# Patient Record
Sex: MALE | Race: BLACK OR AFRICAN AMERICAN | Employment: FULL TIME | URBAN - METROPOLITAN AREA
[De-identification: names, ages, dates, MRNs, and addresses within clinical notes are randomized per-mention and may not be internally consistent; named-entity substitution may affect disease eponyms.]

---

## 2017-08-25 ENCOUNTER — ED HISTORICAL/CONVERTED ENCOUNTER (OUTPATIENT)
Dept: OTHER | Age: 32
End: 2017-08-25

## 2021-06-19 ENCOUNTER — HOSPITAL ENCOUNTER (EMERGENCY)
Age: 36
Discharge: HOME | End: 2021-06-19
Attending: EMERGENCY MEDICINE

## 2021-06-19 VITALS
OXYGEN SATURATION: 98 % | WEIGHT: 200 LBS | RESPIRATION RATE: 20 BRPM | HEIGHT: 66 IN | DIASTOLIC BLOOD PRESSURE: 90 MMHG | BODY MASS INDEX: 32.14 KG/M2 | SYSTOLIC BLOOD PRESSURE: 148 MMHG | HEART RATE: 71 BPM | TEMPERATURE: 98.6 F

## 2021-06-19 DIAGNOSIS — M62.838 MUSCLE SPASMS OF NECK: Primary | ICD-10-CM
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
## 2021-06-19 DIAGNOSIS — T07.XXXA MULTIPLE CONTUSIONS: ICD-10-CM

## 2021-06-19 PROCEDURE — 99283 EMERGENCY DEPT VISIT LOW MDM: CPT

## 2021-06-19 PROCEDURE — 74011250637 HC RX REV CODE- 250/637: Performed by: EMERGENCY MEDICINE

## 2021-06-19 RX ORDER — IBUPROFEN 800 MG/1
800 TABLET ORAL
Qty: 20 TABLET | Refills: 0 | Status: SHIPPED | OUTPATIENT
Start: 2021-06-19 | End: 2021-06-26

## 2021-06-19 RX ORDER — IBUPROFEN 800 MG/1
800 TABLET ORAL
Status: COMPLETED | OUTPATIENT
Start: 2021-06-19 | End: 2021-06-19

## 2021-06-19 RX ORDER — CYCLOBENZAPRINE HCL 10 MG
10 TABLET ORAL
Qty: 15 TABLET | Refills: 0 | Status: SHIPPED | OUTPATIENT
Start: 2021-06-19 | End: 2021-06-24

## 2021-06-19 RX ADMIN — IBUPROFEN 800 MG: 800 TABLET, FILM COATED ORAL at 15:11
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
## 2025-06-29 ENCOUNTER — APPOINTMENT (OUTPATIENT)
Facility: HOSPITAL | Age: 40
DRG: 065 | End: 2025-06-29
Payer: MEDICAID

## 2025-06-29 ENCOUNTER — HOSPITAL ENCOUNTER (INPATIENT)
Facility: HOSPITAL | Age: 40
LOS: 3 days | Discharge: NURSING, REHAB OR LONG-TERM CARE | DRG: 065 | End: 2025-07-02
Attending: EMERGENCY MEDICINE | Admitting: INTERNAL MEDICINE
Payer: MEDICAID

## 2025-06-29 DIAGNOSIS — I63.9 CEREBROVASCULAR ACCIDENT (CVA), UNSPECIFIED MECHANISM (HCC): Primary | ICD-10-CM

## 2025-06-29 PROBLEM — R09.89 SUSPECTED CEREBROVASCULAR ACCIDENT (CVA): Status: ACTIVE | Noted: 2025-06-29

## 2025-06-29 LAB
ALBUMIN SERPL-MCNC: 4.3 G/DL (ref 3.5–5)
ALBUMIN/GLOB SERPL: 1.3 (ref 1.1–2.2)
ALP SERPL-CCNC: 123 U/L (ref 45–117)
ALT SERPL-CCNC: 29 U/L (ref 12–78)
ANION GAP SERPL CALC-SCNC: 5 MMOL/L (ref 2–12)
AST SERPL W P-5'-P-CCNC: 24 U/L (ref 15–37)
BASOPHILS # BLD: 0.09 K/UL (ref 0–0.1)
BASOPHILS NFR BLD: 1 % (ref 0–1)
BILIRUB SERPL-MCNC: 0.5 MG/DL (ref 0.2–1)
BUN SERPL-MCNC: 12 MG/DL (ref 6–20)
BUN/CREAT SERPL: 10 (ref 12–20)
CA-I BLD-MCNC: 9.4 MG/DL (ref 8.5–10.1)
CHLORIDE SERPL-SCNC: 112 MMOL/L (ref 97–108)
CO2 SERPL-SCNC: 26 MMOL/L (ref 21–32)
CREAT SERPL-MCNC: 1.22 MG/DL (ref 0.7–1.3)
DIFFERENTIAL METHOD BLD: ABNORMAL
EOSINOPHIL # BLD: 0.26 K/UL (ref 0–0.4)
EOSINOPHIL NFR BLD: 3 % (ref 0–7)
ERYTHROCYTE [DISTWIDTH] IN BLOOD BY AUTOMATED COUNT: 14.5 % (ref 11.5–14.5)
GLOBULIN SER CALC-MCNC: 3.4 G/DL (ref 2–4)
GLUCOSE SERPL-MCNC: 122 MG/DL (ref 65–100)
HCT VFR BLD AUTO: 48.3 % (ref 36.6–50.3)
HGB BLD-MCNC: 17.6 G/DL (ref 12.1–17)
IMM GRANULOCYTES # BLD AUTO: 0 K/UL
IMM GRANULOCYTES NFR BLD AUTO: 0 %
INR PPP: 1 (ref 0.9–1.1)
LYMPHOCYTES # BLD: 4.29 K/UL (ref 0.8–3.5)
LYMPHOCYTES NFR BLD: 50 % (ref 12–49)
MCH RBC QN AUTO: 29.9 PG (ref 26–34)
MCHC RBC AUTO-ENTMCNC: 36.4 G/DL (ref 30–36.5)
MCV RBC AUTO: 82 FL (ref 80–99)
MONOCYTES # BLD: 0.26 K/UL (ref 0–1)
MONOCYTES NFR BLD: 3 % (ref 5–13)
NEUTS SEG # BLD: 3.7 K/UL (ref 1.8–8)
NEUTS SEG NFR BLD: 43 % (ref 32–75)
NRBC # BLD: 0 K/UL (ref 0–0.01)
NRBC BLD-RTO: 0 PER 100 WBC
PLATELET # BLD AUTO: 201 K/UL (ref 150–400)
PMV BLD AUTO: 13 FL (ref 8.9–12.9)
POTASSIUM SERPL-SCNC: 4.1 MMOL/L (ref 3.5–5.1)
PROT SERPL-MCNC: 7.7 G/DL (ref 6.4–8.2)
PROTHROMBIN TIME: 13.1 SEC (ref 11.9–14.1)
RBC # BLD AUTO: 5.89 M/UL (ref 4.1–5.7)
RBC MORPH BLD: ABNORMAL
SODIUM SERPL-SCNC: 143 MMOL/L (ref 136–145)
TROPONIN I SERPL HS-MCNC: 21 NG/L (ref 0–76)
WBC # BLD AUTO: 8.6 K/UL (ref 4.1–11.1)

## 2025-06-29 PROCEDURE — 1100000000 HC RM PRIVATE

## 2025-06-29 PROCEDURE — 0042T CT BRAIN PERFUSION: CPT

## 2025-06-29 PROCEDURE — 85025 COMPLETE CBC W/AUTO DIFF WBC: CPT

## 2025-06-29 PROCEDURE — 70496 CT ANGIOGRAPHY HEAD: CPT

## 2025-06-29 PROCEDURE — 80053 COMPREHEN METABOLIC PANEL: CPT

## 2025-06-29 PROCEDURE — 82962 GLUCOSE BLOOD TEST: CPT

## 2025-06-29 PROCEDURE — 93005 ELECTROCARDIOGRAM TRACING: CPT | Performed by: EMERGENCY MEDICINE

## 2025-06-29 PROCEDURE — 99285 EMERGENCY DEPT VISIT HI MDM: CPT

## 2025-06-29 PROCEDURE — 36415 COLL VENOUS BLD VENIPUNCTURE: CPT

## 2025-06-29 PROCEDURE — 72125 CT NECK SPINE W/O DYE: CPT

## 2025-06-29 PROCEDURE — 4A03X5D MEASUREMENT OF ARTERIAL FLOW, INTRACRANIAL, EXTERNAL APPROACH: ICD-10-PCS | Performed by: EMERGENCY MEDICINE

## 2025-06-29 PROCEDURE — 70450 CT HEAD/BRAIN W/O DYE: CPT

## 2025-06-29 PROCEDURE — 85610 PROTHROMBIN TIME: CPT

## 2025-06-29 PROCEDURE — 71045 X-RAY EXAM CHEST 1 VIEW: CPT

## 2025-06-29 PROCEDURE — 6360000004 HC RX CONTRAST MEDICATION: Performed by: EMERGENCY MEDICINE

## 2025-06-29 PROCEDURE — 84484 ASSAY OF TROPONIN QUANT: CPT

## 2025-06-29 PROCEDURE — 6370000000 HC RX 637 (ALT 250 FOR IP): Performed by: EMERGENCY MEDICINE

## 2025-06-29 RX ORDER — ASPIRIN 300 MG/1
300 SUPPOSITORY RECTAL DAILY
Status: DISCONTINUED | OUTPATIENT
Start: 2025-06-30 | End: 2025-07-02 | Stop reason: HOSPADM

## 2025-06-29 RX ORDER — SODIUM CHLORIDE 9 MG/ML
INJECTION, SOLUTION INTRAVENOUS PRN
Status: DISCONTINUED | OUTPATIENT
Start: 2025-06-29 | End: 2025-07-02 | Stop reason: HOSPADM

## 2025-06-29 RX ORDER — POLYETHYLENE GLYCOL 3350 17 G/17G
17 POWDER, FOR SOLUTION ORAL DAILY PRN
Status: DISCONTINUED | OUTPATIENT
Start: 2025-06-29 | End: 2025-07-02 | Stop reason: HOSPADM

## 2025-06-29 RX ORDER — ATORVASTATIN CALCIUM 40 MG/1
80 TABLET, FILM COATED ORAL NIGHTLY
Status: DISCONTINUED | OUTPATIENT
Start: 2025-06-29 | End: 2025-07-02 | Stop reason: HOSPADM

## 2025-06-29 RX ORDER — ASPIRIN 325 MG
325 TABLET ORAL DAILY
Status: DISCONTINUED | OUTPATIENT
Start: 2025-06-30 | End: 2025-07-02 | Stop reason: HOSPADM

## 2025-06-29 RX ORDER — ASPIRIN 81 MG/1
324 TABLET, CHEWABLE ORAL ONCE
Status: COMPLETED | OUTPATIENT
Start: 2025-06-29 | End: 2025-06-29

## 2025-06-29 RX ORDER — SODIUM CHLORIDE 0.9 % (FLUSH) 0.9 %
5-40 SYRINGE (ML) INJECTION PRN
Status: DISCONTINUED | OUTPATIENT
Start: 2025-06-29 | End: 2025-07-02 | Stop reason: HOSPADM

## 2025-06-29 RX ORDER — IOPAMIDOL 755 MG/ML
100 INJECTION, SOLUTION INTRAVASCULAR
Status: COMPLETED | OUTPATIENT
Start: 2025-06-29 | End: 2025-06-29

## 2025-06-29 RX ORDER — LABETALOL HYDROCHLORIDE 5 MG/ML
10 INJECTION, SOLUTION INTRAVENOUS EVERY 10 MIN PRN
Status: DISCONTINUED | OUTPATIENT
Start: 2025-06-29 | End: 2025-07-02 | Stop reason: HOSPADM

## 2025-06-29 RX ORDER — CLOPIDOGREL 300 MG/1
300 TABLET, FILM COATED ORAL ONCE
Status: COMPLETED | OUTPATIENT
Start: 2025-06-29 | End: 2025-06-29

## 2025-06-29 RX ORDER — ONDANSETRON 4 MG/1
4 TABLET, ORALLY DISINTEGRATING ORAL EVERY 8 HOURS PRN
Status: DISCONTINUED | OUTPATIENT
Start: 2025-06-29 | End: 2025-07-02 | Stop reason: HOSPADM

## 2025-06-29 RX ORDER — ENOXAPARIN SODIUM 100 MG/ML
40 INJECTION SUBCUTANEOUS DAILY
Status: DISCONTINUED | OUTPATIENT
Start: 2025-06-30 | End: 2025-07-02 | Stop reason: HOSPADM

## 2025-06-29 RX ORDER — ONDANSETRON 2 MG/ML
4 INJECTION INTRAMUSCULAR; INTRAVENOUS EVERY 6 HOURS PRN
Status: DISCONTINUED | OUTPATIENT
Start: 2025-06-29 | End: 2025-07-02 | Stop reason: HOSPADM

## 2025-06-29 RX ORDER — SODIUM CHLORIDE 0.9 % (FLUSH) 0.9 %
5-40 SYRINGE (ML) INJECTION EVERY 12 HOURS SCHEDULED
Status: DISCONTINUED | OUTPATIENT
Start: 2025-06-29 | End: 2025-07-02 | Stop reason: HOSPADM

## 2025-06-29 RX ADMIN — CLOPIDOGREL BISULFATE 300 MG: 300 TABLET, FILM COATED ORAL at 22:14

## 2025-06-29 RX ADMIN — IOPAMIDOL 100 ML: 755 INJECTION, SOLUTION INTRAVENOUS at 18:55

## 2025-06-29 RX ADMIN — ASPIRIN 324 MG: 81 TABLET, CHEWABLE ORAL at 22:14

## 2025-06-29 NOTE — ED PROVIDER NOTES
[facility] EMERGENCY DEPT  EMERGENCY DEPARTMENT HISTORY AND PHYSICAL EXAM      Date of evaluation: 6/29/2025  Patient Name: Lorne Rader  Birthdate 1985  MRN: [id number]  ED Provider: Kendall Faria MD   Note Started: 6:48 PM EDT 6/29/25    HISTORY OF PRESENT ILLNESS     Chief Complaint   Patient presents with    Cerebrovascular Accident       History Provided By: Patient, spouse     HPI: Lorne Rader is a 40 y.o. male this gentleman presents with last known well of 2:30 AM this morning.  He went to bed at that time woke up at noon and felt off balance getting go right back to sleep woke up just after 2 felt off balance but his wife helped him to the shower noticed right-sided weakness slurred speech and he fell in the shower onto his bottom hit the right side of his head on the faucet no loss of consciousness denies neck pain.  Denies headache or vision changes.  Denies blood thinners alcohol or illicit drugs.  History of hypertension.    PAST MEDICAL HISTORY   Past Medical History:  History reviewed. No pertinent past medical history.    Past Surgical History:  History reviewed. No pertinent surgical history.    Family History:  History reviewed. No pertinent family history.    Social History:  Social History     Tobacco Use    Smoking status: Every Day     Types: Cigarettes    Smokeless tobacco: Never       Allergies:  Allergies   Allergen Reactions    Amoxicillin     Pcn [Penicillins]        PCP: No primary care provider on file.    Current Meds:   No current facility-administered medications for this encounter.     Current Outpatient Medications   Medication Sig Dispense Refill    atorvastatin (LIPITOR) 80 MG tablet Take 1 tablet by mouth nightly 30 tablet 3    aspirin 81 MG EC tablet Take 1 tablet by mouth daily 90 tablet 0       Social Determinants of Health:   Social Drivers of Health     Tobacco Use: High Risk (6/30/2025)    Patient History     Smoking Tobacco Use: Every Day     Smokeless Tobacco

## 2025-06-29 NOTE — ED TRIAGE NOTES
Patient reports numbness that started around 1200 today when he got out of bed. States that he felt weak and off balance. States that he took a nap and when he woke up his entire R side was completely numb tried to get in the shower and then fell in the shower on his bottom hit the right side of his head on the faucet.  Denies thinners, denies LOC.  Slurred speech in triage.    Stroke alert called overhead @ 1826       Patient alert and oriented and in wheel chair on arrival but usually walks without assistance.

## 2025-06-30 ENCOUNTER — APPOINTMENT (OUTPATIENT)
Facility: HOSPITAL | Age: 40
DRG: 065 | End: 2025-06-30
Attending: INTERNAL MEDICINE
Payer: MEDICAID

## 2025-06-30 LAB
ANION GAP SERPL CALC-SCNC: 7 MMOL/L (ref 2–12)
BUN SERPL-MCNC: 11 MG/DL (ref 6–20)
BUN/CREAT SERPL: 10 (ref 12–20)
CA-I BLD-MCNC: 9.5 MG/DL (ref 8.5–10.1)
CHLORIDE SERPL-SCNC: 110 MMOL/L (ref 97–108)
CHOLEST SERPL-MCNC: 220 MG/DL
CO2 SERPL-SCNC: 23 MMOL/L (ref 21–32)
CREAT SERPL-MCNC: 1.11 MG/DL (ref 0.7–1.3)
EKG ATRIAL RATE: 54 BPM
EKG DIAGNOSIS: NORMAL
EKG P AXIS: 31 DEGREES
EKG P-R INTERVAL: 156 MS
EKG Q-T INTERVAL: 458 MS
EKG QRS DURATION: 80 MS
EKG QTC CALCULATION (BAZETT): 434 MS
EKG R AXIS: 6 DEGREES
EKG T AXIS: 25 DEGREES
EKG VENTRICULAR RATE: 54 BPM
ERYTHROCYTE [DISTWIDTH] IN BLOOD BY AUTOMATED COUNT: 14.2 % (ref 11.5–14.5)
GLUCOSE BLD STRIP.AUTO-MCNC: 132 MG/DL (ref 65–100)
GLUCOSE SERPL-MCNC: 98 MG/DL (ref 65–100)
HCT VFR BLD AUTO: 46.3 % (ref 36.6–50.3)
HDLC SERPL-MCNC: 45 MG/DL
HDLC SERPL: 4.9 (ref 0–5)
HGB BLD-MCNC: 16.6 G/DL (ref 12.1–17)
LDLC SERPL CALC-MCNC: 144.8 MG/DL (ref 0–100)
LIPID PANEL: ABNORMAL
MCH RBC QN AUTO: 29.4 PG (ref 26–34)
MCHC RBC AUTO-ENTMCNC: 35.9 G/DL (ref 30–36.5)
MCV RBC AUTO: 81.9 FL (ref 80–99)
NRBC # BLD: 0 K/UL (ref 0–0.01)
NRBC BLD-RTO: 0 PER 100 WBC
PERFORMED BY:: ABNORMAL
PLATELET # BLD AUTO: 177 K/UL (ref 150–400)
POTASSIUM SERPL-SCNC: 3.7 MMOL/L (ref 3.5–5.1)
RBC # BLD AUTO: 5.65 M/UL (ref 4.1–5.7)
SODIUM SERPL-SCNC: 140 MMOL/L (ref 136–145)
TRIGL SERPL-MCNC: 151 MG/DL
VLDLC SERPL CALC-MCNC: 30.2 MG/DL
WBC # BLD AUTO: 8.3 K/UL (ref 4.1–11.1)

## 2025-06-30 PROCEDURE — 97165 OT EVAL LOW COMPLEX 30 MIN: CPT

## 2025-06-30 PROCEDURE — 80048 BASIC METABOLIC PNL TOTAL CA: CPT

## 2025-06-30 PROCEDURE — 6360000002 HC RX W HCPCS: Performed by: INTERNAL MEDICINE

## 2025-06-30 PROCEDURE — 6370000000 HC RX 637 (ALT 250 FOR IP): Performed by: INTERNAL MEDICINE

## 2025-06-30 PROCEDURE — 92610 EVALUATE SWALLOWING FUNCTION: CPT

## 2025-06-30 PROCEDURE — 1100000000 HC RM PRIVATE

## 2025-06-30 PROCEDURE — 94761 N-INVAS EAR/PLS OXIMETRY MLT: CPT

## 2025-06-30 PROCEDURE — 36415 COLL VENOUS BLD VENIPUNCTURE: CPT

## 2025-06-30 PROCEDURE — 83036 HEMOGLOBIN GLYCOSYLATED A1C: CPT

## 2025-06-30 PROCEDURE — 97161 PT EVAL LOW COMPLEX 20 MIN: CPT

## 2025-06-30 PROCEDURE — 85027 COMPLETE CBC AUTOMATED: CPT

## 2025-06-30 PROCEDURE — 2500000003 HC RX 250 WO HCPCS: Performed by: INTERNAL MEDICINE

## 2025-06-30 PROCEDURE — 92523 SPEECH SOUND LANG COMPREHEN: CPT

## 2025-06-30 PROCEDURE — G0426 INPT/ED TELECONSULT50: HCPCS | Performed by: SPECIALIST

## 2025-06-30 PROCEDURE — 80061 LIPID PANEL: CPT

## 2025-06-30 PROCEDURE — 70551 MRI BRAIN STEM W/O DYE: CPT

## 2025-06-30 PROCEDURE — 97530 THERAPEUTIC ACTIVITIES: CPT

## 2025-06-30 PROCEDURE — 6370000000 HC RX 637 (ALT 250 FOR IP)

## 2025-06-30 PROCEDURE — 97535 SELF CARE MNGMENT TRAINING: CPT

## 2025-06-30 RX ORDER — NICOTINE 21 MG/24HR
1 PATCH, TRANSDERMAL 24 HOURS TRANSDERMAL DAILY
Status: DISCONTINUED | OUTPATIENT
Start: 2025-06-30 | End: 2025-07-02 | Stop reason: HOSPADM

## 2025-06-30 RX ADMIN — ASPIRIN 325 MG: 325 TABLET ORAL at 08:51

## 2025-06-30 RX ADMIN — SODIUM CHLORIDE, PRESERVATIVE FREE 10 ML: 5 INJECTION INTRAVENOUS at 08:52

## 2025-06-30 RX ADMIN — SODIUM CHLORIDE, PRESERVATIVE FREE 10 ML: 5 INJECTION INTRAVENOUS at 00:20

## 2025-06-30 RX ADMIN — ENOXAPARIN SODIUM 40 MG: 100 INJECTION SUBCUTANEOUS at 08:52

## 2025-06-30 RX ADMIN — ATORVASTATIN CALCIUM 80 MG: 40 TABLET, FILM COATED ORAL at 00:19

## 2025-06-30 RX ADMIN — ATORVASTATIN CALCIUM 80 MG: 40 TABLET, FILM COATED ORAL at 21:22

## 2025-06-30 NOTE — PROGRESS NOTES
4 Eyes Skin Assessment     NAME:  Lorne Rader  YOB: 1985  MEDICAL RECORD NUMBER:  [id number]    The patient is being assessed for  Admission    I agree that at least one RN has performed a thorough Head to Toe Skin Assessment on the patient. ALL assessment sites listed below have been assessed.      Areas assessed by both nurses:    Head, Face, Ears, Shoulders, Back, Chest, Arms, Elbows, Hands, Sacrum. Buttock, Coccyx, Ischium, Legs. Feet and Heels, and Under Medical Devices         Does the Patient have a Wound? No noted wound(s)       Rayray Prevention initiated by RN: No  Wound Care Orders initiated by RN: No    Pressure Injury (Stage 1,2,3,4, Unstageable, DTI, NWPT, and Complex wounds) if present, place Wound referral order by RN under : No    New Ostomies, if present place, Ostomy referral order under : No     Nurse 1 eSignature: Electronically signed by Vero Andrea RN on 6/30/25 at 5:38 AM EDT    **SHARE this note so that the co-signing nurse can place an eSignature**    Nurse 2 eSignature: Electronically signed by Babita Morales RN on 6/30/25 at 7:03 AM EDT

## 2025-06-30 NOTE — PROGRESS NOTES
Spiritual Health History and Assessment/Progress Note  [facility]    Initial Encounter,  ,  ,      Name: Lorne Rader MRN: [id number]    Age: 40 y.o.     Sex: male   Language: English   Evangelical: None   Suspected cerebrovascular accident (CVA)     Date: 6/30/2025                           Spiritual Assessment began in [facility]        Referral/Consult From: Nurse   Encounter Overview/Reason: Initial Encounter  Service Provided For: Patient and family together    Nandini, Belief, Meaning:   Patient identifies as spiritual  Family/Friends identify as spiritual      Importance and Influence:  Patient has spiritual/personal beliefs that influence decisions regarding their health  Family/Friends have spiritual/personal beliefs that influence decisions regarding the patient's health    Community:  Patient Other: Currently not connected with a Methodist  Family/Friends No family/friends present    Assessment and Plan of Care:     Patient Interventions include: Facilitated expression of thoughts and feelings, Explored spiritual coping/struggle/distress, Affirmed coping skills/support systems, Provided sacramental/Hoahaoism ritual, and Facilitated life review and/ or legacy  Family/Friends Interventions include: Facilitated expression of thoughts and feelings    Patient Plan of Care: Spiritual Care available upon further referral  Family/Friends Plan of Care: No spiritual needs identified for follow-up    Electronically signed by AVANI Gore on 6/30/2025 at 1:04 PM     The  visited with the patient and his wife in Room 572. The patient tearfully communicated the shock of his illness, the lesson he believes he is learning, in terms of prioritizing what is important and requested prayer with his family member present in his room.    They both thanked the  for her concern, listening and prayer. They were informed that chaplains are available as needed.

## 2025-06-30 NOTE — CONSULTS
Name: Lorne Rader  : 1985  MRN: [id number]  CSN: [id number]    Consult Date:  2025  Referring Physician:  Vy Mccall MD           [facility] TELE-NEUROLOGY CONSULTATION      ASSESSMENT    Stroke: The patient's symptoms and location of the stroke suggest this is thrombotic rather than embolic      RECOMMENDATIONS    Continue admission according to hospital stroke order sets with cardiac telemetry monitoring  Echocardiogram with bubble study if available during the admission or complete as an outpatient.  Evaluate stroke risk factors (incl HgbA1c).   Provide stroke education including tobacco cessation.   Vital signs and neuro checks are recommended. Any new or worsening symptoms should be imediately referred to the emergency teleneurology service.  Continue aspirin 81 mg daily.  Continue atorvastatin 80 mg daily.   Disposition per Speech, Physical and Occupational Therapy consultants.   I have suggested the patient may be best served in acute inpatient rehab and he expressed willingness.  After discharge, follow up with a local neurologist is recommended.    My impressions and recommendations were discussed with the patient and his girlfriend at the bedside.  I answered questions to the best of my ability until they expressed satisfaction.    There are no further neurological recommendations at this time.  If there are any changes in clinical history or examination please do not hesitate to call the teleneurology consultation service for routine reconsultation.  Thank you for allowing me to participate in the care of this patient.    ========================================  Reason for Consult  I have been asked to see this patient in neurological consultation to render advice and opinion regarding stroke  This teleneurology consultation was undertaken with the patient located in [place] and the provider located in [place].      History of Present Illness  I reviewed the medical records.

## 2025-06-30 NOTE — PLAN OF CARE
PHYSICAL THERAPY EVALUATION  Patient: Lorne Rader (40 y.o. male)  Date: 6/30/2025  Primary Diagnosis: Suspected cerebrovascular accident (CVA) [R09.89]  Cerebrovascular accident (CVA), unspecified mechanism (HCC) [I63.9]       Precautions: General Precautions, Fall Risk                      Recommendations for nursing mobility: Encourage HEP in prep for ADLs/mobility; see handout for details, Frequent repositioning to prevent skin breakdown, Use of bed/chair alarm for safety, and LE elevation for management of edema    In place during session: Peripheral IV and EKG/telemetry     ASSESSMENT  Pt is a 40 y.o. male admitted on 6/29/2025 for rt side weakness; pt currently being treated for CVA work up .Otherwise CTA and CT head no acute finding. MRI IMPRESSION:  Acute to subacute infarct in the left posterior basal ganglia/anterolateral  thalamus.Pt semi supine upon PT arrival, agreeable to evaluation. Pt A&O x 4.      Based on the objective data described below, the patient currently presents with impaired ability to perform high-level IADLs, impaired strength, decreased activity tolerance, impaired balance, and impaired posture. (See below for objective details and assist levels).     Overall pt tolerated session fair/poor today with PT. Pt required mod/max for bed mobility and transfers. Pt amb 3 feet with RW, gt belt and mod assist; demonstrates decreased gen strength and endurance. Patient exhibits RUE and RLE weakness,unable to use RUE,Pt will benefit from continued skilled PT to address above deficits and return to PLOF. Current PT DC recommendation High intensity and comprehensive skilled physical therapy in a multidisciplinary setting as patient is working towards tolerating up to 3 hours of therapy/day x 5-7 days/week once medically appropriate.      GOALS:    Problem: Physical Therapy - Adult  Goal: By Discharge: Performs mobility at highest level of function for planned discharge setting.  See evaluation    History reviewed. No pertinent past medical history.History reviewed. No pertinent surgical history.    Home Situation:  Social/Functional History  Lives With: Significant other  Type of Home: House  Home Layout: One level  Home Access: Stairs to enter with rails  Entrance Stairs - Number of Steps: 0  Bathroom Shower/Tub: Tub/Shower unit  Bathroom Toilet: Standard  Bathroom Equipment: None  Bathroom Accessibility: Accessible  Home Equipment: None  Receives Help From: Family, Friend(s)  Prior Level of Assist for ADLs: Independent  Prior Level of Assist for Homemaking: Independent  Homemaking Responsibilities: Yes  Prior Level of Assist for Ambulation: Independent household ambulator, with or without device  Prior Level of Assist for Transfers: Independent  Active : Yes  Mode of Transportation: Truck  Occupation: Full time employment    Cognitive/Behavioral Status:  Orientation  Overall Orientation Status: Within Normal Limits  Orientation Level: Oriented X4  Cognition  Overall Cognitive Status: WNL    Skin:     Edema:     Strength:    Strength: Generally decreased, functional   Right Left   HIP FLEXION 2+/5 5/5   HIP EXTENSION /5 5/5   HIP ABDUCTION 2/5 5/5   HIP ADDUCTION 2/5 5/5   KNEE FLEXION 2/5 5/5   KNEE EXTENSION 2/5 5/5   ANKLE PF  0/5 5/5   ANKLE DF 0/5 5/5     0/5       No palpable muscle contraction  1/5       Palpable muscle contraction, no joint movement  2-/5      Less than full range of motion in gravity eliminated position  2/5       Able to complete full range of motion in gravity eliminated position  2+/5     Able to initiate movement against gravity  3-/5      More than half but not full range of motion against gravity  3/5       Able to complete full range of motion against gravity  3+/5     Completes full range of motion against gravity with minimal resistance  4-/5      Completes full range of motion against gravity with minimal-moderate resistance  4/5       Completes full range of

## 2025-06-30 NOTE — PROGRESS NOTES
.      Hospitalist Progress Note    NAME:   Lorne Rader   : 1985   MRN: [id number]     Date/Time: 2025 9:04 AM  Patient PCP: No primary care provider on file.    Estimated discharge date:today   Barriers: ECHO, MRI     Hospital Course:  40-year-old male with no past medical history presents for evaluation of right-sided facial droop and right sided weakness along with paresthesia admitted for stroke workup.  CT head CTA head and neck has been unremarkable.  CT cervical spine unremarkable for any cervical spine stenosis.  Patient pending MRI, an echo and teleneurology eval.    Assessment / Plan:    Right upper extremity weakness paresthesia, Left facial  droop, most likely ischemic CVA  - NIH is 4  - Right upper extremity weakness still persists, however slightly improved, left facial droop noted not right  - CT head CTA head and neck unremarkable  - Appearance of hypertension  - Pending echo, MRI  - Follow-up lipid panel, HbA1c  - Continue aspirin, high intensity statin  - PT OT SLP  - Appreciate teleneurology    Hypertension  - Not on any home meds, allow permissive hypertension for the next 24 hours  - Consider adding beta-blockers or ACE/ARB for blood pressure control    Nicotine dependence  - Active smoker for 10+ pack year  - Counseled on smoking cessation, nicotine patch    Medical Decision Making     [x] High (any 2)     A. Problems (any 1)  [] Acute/Chronic Illness/injury posing threat to life or bodily function:    [] Severe exacerbation of chronic illness:    ---------------------------------------------------------------------  B. Risk of Treatment (any 1)   [x] Drugs/treatments that require intensive monitoring for toxicity include:    [] IV ABX requiring serial renal monitoring for nephrotoxicity:     [] IV Narcotic analgesia for adverse drug reaction  [] Aggressive IV diuresis requiring serial monitoring for renal impairment and electrolyte derangements  [] Critical electrolyte  abnormalities requiring IV replacement and close serial monitoring  [] Insulin - monitoring serial FSBS for Hypoglycemic adverse drug reaction  [] Other -   [] Change in code status:    [] Decision to escalate care:    [] Major surgery/procedure with associated risk factors:     ----------------------------------------------------------------------  C. Data (any 2)  [x] Discussed current management and discharge planning options with Case Management.  [x] Discussed management of the case with:  TeLeNeuro   [] Telemetry personally reviewed and interpreted as documented above    [] Imaging personally reviewed and interpreted, includes:     [x] Data Review (any 3)  [x] All available Consultant notes from yesterday/today were reviewed  [x] All current labs were reviewed and interpreted for clinical significance   [x] Appropriate follow-up labs were ordered  [] Collateral history obtained from:           Code Status: Full  DVT Prophylaxis: Lovenox   GI Prophylaxis: not needed     Subjective:     Chief Complaint / Reason for Physician Visit  Pt seen and examined by bedside.  Slurred speech noted, left facial droop, right upper extremity weakness.  Pt doing ok otherwise.       Objective:     VITALS:   Last 24hrs VS reviewed since prior progress note. Most recent are:  Patient Vitals for the past 24 hrs:   BP Temp Temp src Pulse Resp SpO2 Height Weight   06/30/25 0800 -- -- Oral -- -- -- -- --   06/30/25 0713 (!) 156/105 97.9 °F (36.6 °C) Oral 53 18 100 % -- --   06/30/25 0451 -- -- -- 85 -- -- -- --   06/30/25 0450 (!) 177/108 98.2 °F (36.8 °C) Oral 63 18 99 % -- --   06/30/25 0330 (!) 163/92 -- -- -- -- 99 % -- --   06/30/25 0315 (!) 163/92 -- -- 63 29 -- -- --   06/30/25 0236 (!) 188/113 -- -- -- -- 97 % -- --   06/30/25 0014 (!) 188/113 -- -- 67 28 -- -- --   06/30/25 0011 -- -- -- -- -- 97 % -- --   06/30/25 0000 -- -- -- 61 26 97 % -- --   06/29/25 2300 -- -- -- 64 -- 98 % -- --   06/29/25 2245 (!) 190/112 -- -- 62 18

## 2025-06-30 NOTE — PLAN OF CARE
Speech LAnguage Pathology Dysphagia AND SPEECH EVALUATION    Patient: Lorne Rader (40 y.o. male)  Date: 6/30/2025  Primary Diagnosis: Suspected cerebrovascular accident (CVA) [R09.89]  Cerebrovascular accident (CVA), unspecified mechanism (HCC) [I63.9]       Precautions: aspiration General Precautions, Fall Risk                  DIET RECOMMENDATIONS: Regular and thin liquids    SWALLOW SAFETY PRECAUTIONS: 1:1 assistance with ALL PO intake, STRICT aspiration and GERD precautions, monitor pt closely for s/s aspiration, meds as tolerated, FEED ONLY IF AWAKE AND ALERT.    ASSESSMENT : Patient w/ oral dysphagia and moderate dysarthria.        Lorne Rader is a 40 y.o. male with PMH of none presented with chief complaint of right sided facial droop and right-sided weakness and tingling.  Started about 2:30 AM today.  Associated with slurred speech.  MRI results: IMPRESSION:  Acute to subacute infarct in the left posterior basal ganglia/anterolateral  thalamus.       Based on the objective data described below, the patient presents with oral dysphagia labial and lingual weakness, reduced buccal tone right side and labial seal right side resulting in anterior spillage, increased oral residue and reduced bolus cohesion. Right sided facial weakness, more pronounced w/ muscle activation. Lingual deviation to the right.   Prolonged mastication of solids. Increased oral stasis right side. Patient does independently use a lingual sweep to clear. Swallow initiation timely. Pharyngeal response w/o clinical indicators of penetration and aspiration w/ all thin liquid and regular solid trials. Improved labial closure w/ no anterior spillage observed w/ use of cup.   Provided patient w/ smaller straws that improved bolus control. Educated to left side bolus placement, lingual sweep,  improving labial seal and left side straw, cup placement.     Expressive and receptive skills WFL. No aphasia observed.   Patient does present w/ a  by speech-language pathology 5x/week to address goals. Patient's rehabilitation potential is considered to be Good.    Discharge Recommendations:  High intensity and comprehensive skilled speech therapy in a multidisciplinary setting as patient is working towards tolerating up to 3 hours of therapy/day x 5-7 days/week       SUBJECTIVE:   Patient seen at bedside.     OBJECTIVE:   History reviewed. No pertinent past medical history.History reviewed. No pertinent surgical history.      Prior Level of Function/Home Situation:   Social/Functional History  Lives With: Significant other  Type of Home: House  Home Layout: One level  Home Access: Stairs to enter with rails  Entrance Stairs - Number of Steps: 0  Bathroom Shower/Tub: Tub/Shower unit  Bathroom Toilet: Standard  Bathroom Equipment: None  Bathroom Accessibility: Accessible  Home Equipment: None  Receives Help From: Family, Friend(s)  Prior Level of Assist for ADLs: Independent  Prior Level of Assist for Homemaking: Independent  Homemaking Responsibilities: Yes  Prior Level of Assist for Ambulation: Independent household ambulator, with or without device  Prior Level of Assist for Transfers: Independent  Active : Yes  Mode of Transportation: Truck  Occupation: Full time employment    Baseline Assessment:              Hearing: WFL    Cognitive and Communication Status:  Neurologic State: Alert  Orientation Level: Oriented x4  Cognition: Appropriate for age attention/concentration, Appropriate safety awareness, and Follows commands    DYSPHAGIA:  Oral Assessment:  Oral Motor   Labial: Decreased rate;Right droop;Decreased seal  Dentition: Intact  Oral Hygiene Comments: WFL  Lingual: Right deviation;Decreased rate;Decreased strength  Velum: No Impairment  Mandible: No impairment         SPEECH-LANGUAGE:  Motor Speech:  Motor Speech  Apraxic Characteristics: None  Dysarthric Characteristics: Blended word boundaries;Decreased breath

## 2025-06-30 NOTE — PLAN OF CARE
Problem: Discharge Planning  Goal: Discharge to home or other facility with appropriate resources  Outcome: Progressing     Problem: Safety - Adult  Goal: Free from fall injury  Outcome: Progressing     Problem: ABCDS Injury Assessment  Goal: Absence of physical injury  Outcome: Progressing     Pt arrived to unit from ED, AAOX4, partner at bedside, no c/o pain, NIHSS of 4 upon arrival, pt has slurred speech and weakness to RUE, NSR on tele, bed locked in low position, call light within reach, bed alarm on.

## 2025-06-30 NOTE — PLAN OF CARE
OCCUPATIONAL THERAPY EVALUATION  Patient: Lorne Rader (40 y.o. male)  Date: 6/30/2025  Primary Diagnosis: Suspected cerebrovascular accident (CVA) [R09.89]  Cerebrovascular accident (CVA), unspecified mechanism (HCC) [I63.9]       Precautions: General Precautions, Fall Risk                Recommendations for nursing mobility: Use of BSC for toileting  and Assist x1    In place during session:EKG/telemetry   ASSESSMENT  Pt is a 40 y.o. male presenting to [facility] with c/o Right Side facial Droop and Right side weakness, was admitted 6/29/2025 and currently being treated for CVA. Pt received semi-supine in bed upon arrival, A  & O x 4, and agreeable to OT evaluation.     Based on current observations, pt presents with decreased  functional mobility, independence in ADLs, coordination, balance, fine-motor control (see below for objective details and assist levels).     Overall, pt tolerates session with additional time and adjusting to using compensatory techniques coupled with changing hand dominance (was Right Handed PTA, RUE not functional at this time due to residual effect of CVA). He currently needs assistance for the following: bed mobility, transfers, eating, lower body dressing, and toileting. Pt will benefit from continued skilled OT services to address current impairments and improve IND and safety with self cares and functional transfers/mobility. Current OT d/c recommendation High intensity and comprehensive skilled occupational therapy in a multidisciplinary setting as patient is working towards tolerating up to 3 hours of therapy/day x 5-7 days/week once medically appropriate.    GOALS:    Problem: Occupational Therapy - Adult  Goal: By Discharge: Performs self-care activities at highest level of function for planned discharge setting.  See evaluation for individualized goals.  Description: FUNCTIONAL STATUS PRIOR TO ADMISSION:  Patient was ambulatory and actively engaged in the following ADL's:   None  Education Outcome: Verbalized understanding;Demonstrated understanding (Patient was able to recall information regarding BE FAST.)    The supervising occupational therapist and treating occupational therapist assistant have met to review this patient’s progress and plan of care.    This patient’s plan of care is appropriate for delegation to [facility].     Thank you for this referral,  Edwardo Walker OT  Minutes: 30

## 2025-06-30 NOTE — H&P
History & Physical    Primary Care Provider: No primary care provider on file.  Source of Information: Patient/family unless mentioned otherwise below    Chief complaint:   Chief Complaint   Patient presents with    Cerebrovascular Accident          HPI & Physical exam     History of presenting illness:    Lorne Rader is a 40 y.o. male with PMH of none presented with chief complaint of right sided facial droop and right-sided weakness and tingling.  Started about 2:30 AM today.  Associated with slurred speech. No vision changes.       In the ED, noted hypertensive. Lab results within normal limits. Carotid artery patent bilaterally. Otherwise CTA and CT head no acute finding..       Chart review: none   Incidental imaging findings: none       No past medical history on file.      Physical Exam:     BP (!) 190/112   Pulse 62   Temp 97.9 °F (36.6 °C) (Oral)   Resp 18   Ht 1.753 m (5' 9\")   Wt 88 kg (194 lb)   SpO2 90%   BMI 28.65 kg/m²    O2 Device: None (Room air)    General: Alert, cooperative, no distress  Head: Normocephalic, without obvious abnormality, atraumatic.   Neck: Neck supple, symmetrical, trachea midline.   Eyes: Conjunctivae/corneas clear. PERRL, EOMs intact.  Oral: Lips, mucosa, and tongue normal.   Lungs: Clear to auscultation bilaterally.   Heart: Regular rate and rhythm, S1, S2 normal, no murmur, click, rub or gallop.  Abdomen: Soft, non-tender. Bowel sounds normal. No masses.  Extremities: no lower extremities edema. Extremities normal, atraumatic. Moving all four extremities.   Pulses: 2+ and symmetric all extremities.  Skin: Skin color, texture, turgor normal. No rashes or lesions  Neurologic: Right-sided facial droop noted, tongue deviated to the left.  Right upper and lower extremity 2/5 motor function, decreased sensation on right upper and lower extremity.  Upper and lower extremity intact motor and sensory function.  Looking    NIH Stroke Scale  NIH Stroke Scale Assessed:

## 2025-06-30 NOTE — PLAN OF CARE
Problem: Physical Therapy - Adult  Goal: By Discharge: Performs mobility at highest level of function for planned discharge setting.  See evaluation for individualized goals.  Description: FUNCTIONAL STATUS PRIOR TO ADMISSION: Patient was independent and active without use of DME.    HOME SUPPORT PRIOR TO ADMISSION: The patient lived with SO but did not require assistance.    Physical Therapy Goals  Initiated 6/30/2025  Pt stated goal: Get better  Pt will be I with LE HEP in 7 days.  Pt will perform bed mobility with Modified [place] in 7 days.  Pt will perform transfers with Modified [place] in 7 days.   Pt will amb 20 feet with LRAD safely with Minimal Assist and julio walker in 7 days.  Pt will verbalize and demonstrate compliance with fall precautions  in 7 days.   Pt will demonstrate improvement in standing/gait balance from fair/poor to good in 7 days.    6/30/2025 1433 by Angelique Chacon, PT  Outcome: Not Progressing

## 2025-06-30 NOTE — CARE COORDINATION
06/30/25 1059   Service Assessment   Patient Orientation Alert and Oriented   Cognition Alert   History Provided By Patient   Primary Caregiver Self   Support Systems Spouse/Significant Other;Parent;Family Members   Patient's Healthcare Decision Maker is: Legal Next of Kin   PCP Verified by CM No   Prior Functional Level Independent in ADLs/IADLs   Current Functional Level Other (see comment)  (Pending Therapy Recs)   Can patient return to prior living arrangement Yes   Ability to make needs known: Good   Family able to assist with home care needs: Yes   Would you like for me to discuss the discharge plan with any other family members/significant others, and if so, who? Yes     Advance Care Planning     General Advance Care Planning (ACP) Conversation    Date of Conversation: 6/30/2025  Conducted with: Patient with Decision Making Capacity  Other persons present: None    Healthcare Decision Maker:   Primary Decision Maker: Chery Faria - [facility] - [phone]         Length of Voluntary ACP Conversation in minutes:  <16 minutes (Non-Billable)    Ben Hess RN CM met with patient at bedside to complete discharge planning assessment.  Patient lives at home at address on file independently.  Patient was independent prior to hospitalization.  NO DME use.  CM spoke with PT and PT is recommending IRF at this time.  CM confirmed that patient has no insurance and informed patient that we can attempt a nikki bed at [facility].  Patient is agreeable, CM sent referral to [facility] in [place], [place].  Patient is pending review at this time.

## 2025-06-30 NOTE — ED NOTES
ED TO INPATIENT SBAR HANDOFF    Patient Name: Lorne Rader   Preferred Name: Lorne  : 1985  40 y.o.   Family/Caregiver Present: no   Code Status Order: Full Code  PO Status: Adult regular  Telemetry Order: Yes  C-SSRS: Risk of Suicide: No Risk  Sitter none  Restraints:     Sepsis Risk Score Sepsis V2 Risk Score: 2.6    Situation  Chief Complaint   Patient presents with    Cerebrovascular Accident     Brief Description of Patient's Condition: Patient arrived to the ER about 9 hours ago, patient started having numbness around noon on  when he got out of bed. Patient states he was feeling weak and his balance was off. Patient states when he took a nap his entire right side was numb and tried getting in the shower but fell and hit his head on the bottom right of the faucet. Patient is going to get a stroke workup. His MRI check list is complete, by patient. And patients girlfriend is at bedside. Patient answers all questions appropriately, GCS 15, patient still continues with right sided weakness, numbness, and right sided facial droop. Next neuro check is at 0636.    Mental Status: oriented, alert, coherent, logical, thought processes intact, and able to concentrate and follow conversation  Arrived from:Home  Imaging:   CT CERVICAL SPINE WO CONTRAST   Final Result   No acute fracture      Electronically signed by Cathie Chavez      XR CHEST PORTABLE   Final Result   No acute cardiopulmonary abnormality.         Electronically signed by [email or web address]      CTA HEAD NECK W CONTRAST   Final Result   No large vessel occlusion or hemodynamically significant carotid stenosis.   No matched perfusion defect      Electronically signed by trivago      CT BRAIN PERFUSION   Final Result   No large vessel occlusion or hemodynamically significant carotid stenosis.   No matched perfusion defect      Electronically signed by trivago      CT HEAD WO CONTRAST   Final Result         No acute abnormality

## 2025-07-01 ENCOUNTER — APPOINTMENT (OUTPATIENT)
Facility: HOSPITAL | Age: 40
DRG: 065 | End: 2025-07-01
Attending: INTERNAL MEDICINE
Payer: MEDICAID

## 2025-07-01 LAB
ANION GAP SERPL CALC-SCNC: 7 MMOL/L (ref 2–12)
BASOPHILS # BLD: 0.02 K/UL (ref 0–0.1)
BASOPHILS NFR BLD: 0.3 % (ref 0–1)
BUN SERPL-MCNC: 11 MG/DL (ref 6–20)
BUN/CREAT SERPL: 10 (ref 12–20)
CA-I BLD-MCNC: 9.2 MG/DL (ref 8.5–10.1)
CHLORIDE SERPL-SCNC: 110 MMOL/L (ref 97–108)
CO2 SERPL-SCNC: 22 MMOL/L (ref 21–32)
CREAT SERPL-MCNC: 1.14 MG/DL (ref 0.7–1.3)
DIFFERENTIAL METHOD BLD: ABNORMAL
ECHO AO ASC DIAM: 3.5 CM
ECHO AO ASCENDING AORTA INDEX: 1.72 CM/M2
ECHO AO ROOT DIAM: 3.1 CM
ECHO AO ROOT INDEX: 1.52 CM/M2
ECHO AV AREA PEAK VELOCITY: 3.5 CM2
ECHO AV AREA VTI: 3.5 CM2
ECHO AV AREA/BSA PEAK VELOCITY: 1.7 CM2/M2
ECHO AV AREA/BSA VTI: 1.7 CM2/M2
ECHO AV MEAN GRADIENT: 7 MMHG
ECHO AV MEAN VELOCITY: 1.3 M/S
ECHO AV PEAK GRADIENT: 11 MMHG
ECHO AV PEAK VELOCITY: 1.6 M/S
ECHO AV VELOCITY RATIO: 0.88
ECHO AV VTI: 34.1 CM
ECHO BSA: 2.07 M2
ECHO EST RA PRESSURE: 3 MMHG
ECHO LA AREA 2C: 15.9 CM2
ECHO LA AREA 4C: 18.7 CM2
ECHO LA DIAMETER INDEX: 1.72 CM/M2
ECHO LA DIAMETER: 3.5 CM
ECHO LA MAJOR AXIS: 5.5 CM
ECHO LA MINOR AXIS: 5.4 CM
ECHO LA TO AORTIC ROOT RATIO: 1.13
ECHO LA VOL BP: 43 ML (ref 18–58)
ECHO LA VOL MOD A2C: 37 ML (ref 18–58)
ECHO LA VOL MOD A4C: 51 ML (ref 18–58)
ECHO LA VOL/BSA BIPLANE: 21 ML/M2 (ref 16–34)
ECHO LA VOLUME INDEX MOD A2C: 18 ML/M2 (ref 16–34)
ECHO LA VOLUME INDEX MOD A4C: 25 ML/M2 (ref 16–34)
ECHO LV E' LATERAL VELOCITY: 9.57 CM/S
ECHO LV E' SEPTAL VELOCITY: 5.22 CM/S
ECHO LV EDV A2C: 79 ML
ECHO LV EDV A4C: 166 ML
ECHO LV EDV INDEX A4C: 81 ML/M2
ECHO LV EDV NDEX A2C: 39 ML/M2
ECHO LV EF PHYSICIAN: 60 %
ECHO LV EJECTION FRACTION A2C: 46 %
ECHO LV EJECTION FRACTION A4C: 57 %
ECHO LV EJECTION FRACTION BIPLANE: 54 % (ref 55–100)
ECHO LV ESV A2C: 42 ML
ECHO LV ESV A4C: 72 ML
ECHO LV ESV INDEX A2C: 21 ML/M2
ECHO LV ESV INDEX A4C: 35 ML/M2
ECHO LV FRACTIONAL SHORTENING: 28 % (ref 28–44)
ECHO LV INTERNAL DIMENSION DIASTOLE INDEX: 2.84 CM/M2
ECHO LV INTERNAL DIMENSION DIASTOLIC: 5.8 CM (ref 4.2–5.9)
ECHO LV INTERNAL DIMENSION SYSTOLIC INDEX: 2.06 CM/M2
ECHO LV INTERNAL DIMENSION SYSTOLIC: 4.2 CM
ECHO LV IVSD: 1 CM (ref 0.6–1)
ECHO LV MASS 2D: 248.5 G (ref 88–224)
ECHO LV MASS INDEX 2D: 121.8 G/M2 (ref 49–115)
ECHO LV POSTERIOR WALL DIASTOLIC: 1.1 CM (ref 0.6–1)
ECHO LV RELATIVE WALL THICKNESS RATIO: 0.38
ECHO LVOT AREA: 4.2 CM2
ECHO LVOT AV VTI INDEX: 0.84
ECHO LVOT DIAM: 2.3 CM
ECHO LVOT MEAN GRADIENT: 4 MMHG
ECHO LVOT PEAK GRADIENT: 8 MMHG
ECHO LVOT PEAK VELOCITY: 1.4 M/S
ECHO LVOT STROKE VOLUME INDEX: 58.2 ML/M2
ECHO LVOT SV: 118.8 ML
ECHO LVOT VTI: 28.6 CM
ECHO MV A VELOCITY: 0.84 M/S
ECHO MV AREA VTI: 3.2 CM2
ECHO MV E DECELERATION TIME (DT): 398 MS
ECHO MV E VELOCITY: 0.57 M/S
ECHO MV E/A RATIO: 0.68
ECHO MV E/E' LATERAL: 5.96
ECHO MV E/E' RATIO (AVERAGED): 8.44
ECHO MV E/E' SEPTAL: 10.92
ECHO MV LVOT VTI INDEX: 1.28
ECHO MV MAX VELOCITY: 1 M/S
ECHO MV MEAN GRADIENT: 1 MMHG
ECHO MV MEAN VELOCITY: 0.5 M/S
ECHO MV PEAK GRADIENT: 4 MMHG
ECHO MV VTI: 36.7 CM
ECHO RA AREA 4C: 17.1 CM2
ECHO RA END SYSTOLIC VOLUME APICAL 4 CHAMBER INDEX BSA: 20 ML/M2
ECHO RA VOLUME: 40 ML
ECHO RV BASAL DIMENSION: 3.3 CM
ECHO RV FREE WALL PEAK S': 15.8 CM/S
ECHO RV TAPSE: 2.5 CM (ref 1.7–?)
EOSINOPHIL # BLD: 0.25 K/UL (ref 0–0.4)
EOSINOPHIL NFR BLD: 3.5 % (ref 0–7)
ERYTHROCYTE [DISTWIDTH] IN BLOOD BY AUTOMATED COUNT: 14.6 % (ref 11.5–14.5)
EST. AVERAGE GLUCOSE BLD GHB EST-MCNC: 108 MG/DL
GLUCOSE SERPL-MCNC: 110 MG/DL (ref 65–100)
HBA1C MFR BLD: 5.4 % (ref 4–5.6)
HCT VFR BLD AUTO: 47.8 % (ref 36.6–50.3)
HGB BLD-MCNC: 17.5 G/DL (ref 12.1–17)
IMM GRANULOCYTES # BLD AUTO: 0.02 K/UL (ref 0–0.04)
IMM GRANULOCYTES NFR BLD AUTO: 0.3 % (ref 0–0.5)
LYMPHOCYTES # BLD: 2.59 K/UL (ref 0.8–3.5)
LYMPHOCYTES NFR BLD: 36.7 % (ref 12–49)
MAGNESIUM SERPL-MCNC: 2.2 MG/DL (ref 1.6–2.4)
MCH RBC QN AUTO: 30.5 PG (ref 26–34)
MCHC RBC AUTO-ENTMCNC: 36.6 G/DL (ref 30–36.5)
MCV RBC AUTO: 83.3 FL (ref 80–99)
MONOCYTES # BLD: 0.52 K/UL (ref 0–1)
MONOCYTES NFR BLD: 7.4 % (ref 5–13)
NEUTS SEG # BLD: 3.66 K/UL (ref 1.8–8)
NEUTS SEG NFR BLD: 51.8 % (ref 32–75)
NRBC # BLD: 0 K/UL (ref 0–0.01)
NRBC BLD-RTO: 0 PER 100 WBC
PHOSPHATE SERPL-MCNC: 3.1 MG/DL (ref 2.6–4.7)
PLATELET # BLD AUTO: 167 K/UL (ref 150–400)
PMV BLD AUTO: 13 FL (ref 8.9–12.9)
POTASSIUM SERPL-SCNC: 3.7 MMOL/L (ref 3.5–5.1)
RBC # BLD AUTO: 5.74 M/UL (ref 4.1–5.7)
SODIUM SERPL-SCNC: 139 MMOL/L (ref 136–145)
WBC # BLD AUTO: 7.1 K/UL (ref 4.1–11.1)

## 2025-07-01 PROCEDURE — 6360000002 HC RX W HCPCS: Performed by: INTERNAL MEDICINE

## 2025-07-01 PROCEDURE — 97530 THERAPEUTIC ACTIVITIES: CPT

## 2025-07-01 PROCEDURE — 80048 BASIC METABOLIC PNL TOTAL CA: CPT

## 2025-07-01 PROCEDURE — 85025 COMPLETE CBC W/AUTO DIFF WBC: CPT

## 2025-07-01 PROCEDURE — 2500000003 HC RX 250 WO HCPCS: Performed by: INTERNAL MEDICINE

## 2025-07-01 PROCEDURE — 36415 COLL VENOUS BLD VENIPUNCTURE: CPT

## 2025-07-01 PROCEDURE — 84100 ASSAY OF PHOSPHORUS: CPT

## 2025-07-01 PROCEDURE — 83735 ASSAY OF MAGNESIUM: CPT

## 2025-07-01 PROCEDURE — 6370000000 HC RX 637 (ALT 250 FOR IP)

## 2025-07-01 PROCEDURE — 6370000000 HC RX 637 (ALT 250 FOR IP): Performed by: INTERNAL MEDICINE

## 2025-07-01 PROCEDURE — 93308 TTE F-UP OR LMTD: CPT

## 2025-07-01 PROCEDURE — 1100000000 HC RM PRIVATE

## 2025-07-01 PROCEDURE — 93321 DOPPLER ECHO F-UP/LMTD STD: CPT

## 2025-07-01 RX ORDER — ASPIRIN 81 MG/1
81 TABLET ORAL DAILY
Qty: 90 TABLET | Refills: 0 | Status: SHIPPED | OUTPATIENT
Start: 2025-07-01

## 2025-07-01 RX ORDER — ATORVASTATIN CALCIUM 80 MG/1
80 TABLET, FILM COATED ORAL NIGHTLY
Qty: 30 TABLET | Refills: 3 | Status: SHIPPED | OUTPATIENT
Start: 2025-07-01

## 2025-07-01 RX ADMIN — ENOXAPARIN SODIUM 40 MG: 100 INJECTION SUBCUTANEOUS at 08:44

## 2025-07-01 RX ADMIN — SODIUM CHLORIDE, PRESERVATIVE FREE 10 ML: 5 INJECTION INTRAVENOUS at 08:47

## 2025-07-01 RX ADMIN — ASPIRIN 325 MG: 325 TABLET ORAL at 08:43

## 2025-07-01 RX ADMIN — ATORVASTATIN CALCIUM 80 MG: 40 TABLET, FILM COATED ORAL at 20:55

## 2025-07-01 NOTE — DISCHARGE SUMMARY
Physician Discharge Summary     Patient ID:    Lorne Rader  [id number]  40 y.o.  1985    Admit date: 6/29/2025    Discharge date : 7/1/2025      Final Diagnoses:   Suspected cerebrovascular accident (CVA) [R09.89]  Cerebrovascular accident (CVA), unspecified mechanism (HCC) [I63.9]  acute ischemic stroke  hyperlipidemia  hypertension    Reason for Hospitalization:    Lorne Rader is a 40 y.o. male with PMH of none presented with chief complaint of right sided facial droop and right-sided weakness and tingling.  Started about 2:30 AM today.  Associated with slurred speech. No vision changes.      In the ED, noted hypertensive. Lab results within normal limits. Carotid artery patent bilaterally. Otherwise CTA and CT head no acute finding.    Hospital Course:     This is a 40-year-old male who does not follow-up with any physicians, no recent medical history or does not take any medications but did report having hypertension in the past presented to the emergency department with right-sided tingling and having difficulty using his right side. Patient was found to be very hypotensive in the emergency department. CTA and CT had unremarkable. MRI brain revealed acute to subacute infarct in the left posterior basal ganglia/anterior lateral thalamus. Urology consulted, recommended continuing ASA 81, atorvastatin 80. Patient was discharged with recommendations to follow-up with local neurologist outpatient.  Patient does smoke one pack of cigarettes daily, advised patient to quit smoking.  A1c 5.4. Lipid panel with elevated cholesterol 220 and triglycerides 151, .8.    Discharge Medications:      Medication List        START taking these medications      aspirin 81 MG EC tablet  Take 1 tablet by mouth daily     atorvastatin 80 MG tablet  Commonly known as: LIPITOR  Take 1 tablet by mouth nightly               Where to Get Your Medications        These medications were sent to [facility]/pharmacy  %); Hemoglobin 16.6 g/dL (Ref range: 12.1 - 17.0 g/dL); Potassium 3.7 mmol/L (Ref range: 3.5 - 5.1 mmol/L); Sodium 140 mmol/L (Ref range: 136 - 145 mmol/L)  Recent Labs     06/30/25  0519 07/01/25 0633   WBC 8.3 7.1   HGB 16.6 17.5*   HCT 46.3 47.8    167     Recent Labs     06/29/25  1835 06/30/25 0519 07/01/25 0633    140 139   K 4.1 3.7 3.7   * 110* 110*   CO2 26 23 22   BUN 12 11 11   CREATININE 1.22 1.11 1.14   GLUCOSE 122* 98 110*   CALCIUM 9.4 9.5 9.2   MG  --   --  2.2   PHOS  --   --  3.1     Recent Labs     06/29/25 1835   AST 24   ALT 29   ALKPHOS 123*   BILITOT 0.5   GLOB 3.4     Recent Labs     06/29/25 1835   INR 1.0   PROTIME 13.1      No results for input(s): \"IRON\", \"TIBC\" in the last 72 hours.    Invalid input(s): \"PSAT\", \"FERR\"   No results for input(s): \"PH\", \"PCO2\", \"PO2\" in the last 72 hours.  No results for input(s): \"CKTOTAL\", \"CKMB\", \"TROPONINI\" in the last 72 hours.  Lab Results   Component Value Date/Time    POCGLU 132 06/29/2025 06:28 PM         Discharge time spent 35 minutes    Signed:  Vic Stewart MD  7/1/2025  3:33 PM

## 2025-07-01 NOTE — CARE COORDINATION
1504: [facility] nikki bed approved. Pending nikki.    0837: CM reviewed chart. [facility] reviewing for nikki bed acceptance and availability. CM will continue to follow.

## 2025-07-01 NOTE — PLAN OF CARE
PHYSICAL THERAPY TREATMENT     Patient: Lorne Rader (40 y.o. male)  Date: 7/1/2025  Diagnosis: Suspected cerebrovascular accident (CVA) [R09.89]  Cerebrovascular accident (CVA), unspecified mechanism (HCC) [I63.9] Suspected cerebrovascular accident (CVA)      Precautions: General Precautions, Fall Risk                      Recommendations for nursing mobility: Encourage HEP in prep for ADLs/mobility; see handout for details, Frequent repositioning to prevent skin breakdown, and Use of bed/chair alarm for safety    In place during session: Peripheral IV and EKG/telemetry   Chart, physical therapy assessment, plan of care and goals were reviewed.  ASSESSMENT  Patient continues with skilled PT services and is slowly progressing towards goals. Pt semi supine upon PT arrival, agreeable to session. Pt A&O x 4. (See below for objective details and assist levels).     Overall pt tolerated session fair today with PT. Patient able to perform bed mob and transfers with min assist.Exhibits increased strength in RLE.RUE still not getting strength return. Will continue to benefit from skilled PT services, and will continue to progress as tolerated. Current PT DC recommendation High intensity and comprehensive skilled physical therapy in a multidisciplinary setting as patient is working towards tolerating up to 3 hours of therapy/day x 5-7 days/week once medically appropriate.      GOALS:    Problem: Physical Therapy - Adult  Goal: By Discharge: Performs mobility at highest level of function for planned discharge setting.  See evaluation for individualized goals.  Description: FUNCTIONAL STATUS PRIOR TO ADMISSION: Patient was independent and active without use of DME.    HOME SUPPORT PRIOR TO ADMISSION: The patient lived with SO but did not require assistance.    Physical Therapy Goals  Initiated 6/30/2025  Pt stated goal: Get better  Pt will be I with LE HEP in 7 days.  Pt will perform bed mobility with Modified

## 2025-07-01 NOTE — DISCHARGE INSTRUCTIONS
Continue aspirin 81, atorvastatin 80  follow-up with primary care physician  follow-up with neurologist

## 2025-07-01 NOTE — PROGRESS NOTES
OT tx attempted at 1415 however pt off the floor. Will continue to follow and re-attempt OT at a later time. Thank you.

## 2025-07-02 VITALS
OXYGEN SATURATION: 97 % | TEMPERATURE: 97.9 F | HEART RATE: 81 BPM | WEIGHT: 194 LBS | BODY MASS INDEX: 28.73 KG/M2 | HEIGHT: 69 IN | SYSTOLIC BLOOD PRESSURE: 157 MMHG | DIASTOLIC BLOOD PRESSURE: 110 MMHG | RESPIRATION RATE: 18 BRPM

## 2025-07-02 LAB
ANION GAP SERPL CALC-SCNC: 6 MMOL/L (ref 2–12)
BASOPHILS # BLD: 0.02 K/UL (ref 0–0.1)
BASOPHILS NFR BLD: 0.2 % (ref 0–1)
BUN SERPL-MCNC: 13 MG/DL (ref 6–20)
BUN/CREAT SERPL: 11 (ref 12–20)
CA-I BLD-MCNC: 9.5 MG/DL (ref 8.5–10.1)
CHLORIDE SERPL-SCNC: 112 MMOL/L (ref 97–108)
CO2 SERPL-SCNC: 22 MMOL/L (ref 21–32)
CREAT SERPL-MCNC: 1.14 MG/DL (ref 0.7–1.3)
DIFFERENTIAL METHOD BLD: ABNORMAL
EOSINOPHIL # BLD: 0.21 K/UL (ref 0–0.4)
EOSINOPHIL NFR BLD: 2.6 % (ref 0–7)
ERYTHROCYTE [DISTWIDTH] IN BLOOD BY AUTOMATED COUNT: 14.3 % (ref 11.5–14.5)
GLUCOSE SERPL-MCNC: 106 MG/DL (ref 65–100)
HCT VFR BLD AUTO: 46.6 % (ref 36.6–50.3)
HGB BLD-MCNC: 17.1 G/DL (ref 12.1–17)
IMM GRANULOCYTES # BLD AUTO: 0.02 K/UL (ref 0–0.04)
IMM GRANULOCYTES NFR BLD AUTO: 0.2 % (ref 0–0.5)
LYMPHOCYTES # BLD: 2.08 K/UL (ref 0.8–3.5)
LYMPHOCYTES NFR BLD: 25.8 % (ref 12–49)
MCH RBC QN AUTO: 30.1 PG (ref 26–34)
MCHC RBC AUTO-ENTMCNC: 36.7 G/DL (ref 30–36.5)
MCV RBC AUTO: 81.9 FL (ref 80–99)
MONOCYTES # BLD: 0.53 K/UL (ref 0–1)
MONOCYTES NFR BLD: 6.6 % (ref 5–13)
NEUTS SEG # BLD: 5.2 K/UL (ref 1.8–8)
NEUTS SEG NFR BLD: 64.6 % (ref 32–75)
NRBC # BLD: 0 K/UL (ref 0–0.01)
NRBC BLD-RTO: 0 PER 100 WBC
PLATELET # BLD AUTO: 172 K/UL (ref 150–400)
POTASSIUM SERPL-SCNC: 3.7 MMOL/L (ref 3.5–5.1)
RBC # BLD AUTO: 5.69 M/UL (ref 4.1–5.7)
SODIUM SERPL-SCNC: 140 MMOL/L (ref 136–145)
WBC # BLD AUTO: 8.1 K/UL (ref 4.1–11.1)

## 2025-07-02 PROCEDURE — 92507 TX SP LANG VOICE COMM INDIV: CPT

## 2025-07-02 PROCEDURE — 80048 BASIC METABOLIC PNL TOTAL CA: CPT

## 2025-07-02 PROCEDURE — 6370000000 HC RX 637 (ALT 250 FOR IP)

## 2025-07-02 PROCEDURE — 97530 THERAPEUTIC ACTIVITIES: CPT

## 2025-07-02 PROCEDURE — 2500000003 HC RX 250 WO HCPCS: Performed by: INTERNAL MEDICINE

## 2025-07-02 PROCEDURE — 36415 COLL VENOUS BLD VENIPUNCTURE: CPT

## 2025-07-02 PROCEDURE — 85025 COMPLETE CBC W/AUTO DIFF WBC: CPT

## 2025-07-02 PROCEDURE — 6370000000 HC RX 637 (ALT 250 FOR IP): Performed by: INTERNAL MEDICINE

## 2025-07-02 PROCEDURE — 6360000002 HC RX W HCPCS: Performed by: INTERNAL MEDICINE

## 2025-07-02 RX ADMIN — ENOXAPARIN SODIUM 40 MG: 100 INJECTION SUBCUTANEOUS at 10:27

## 2025-07-02 RX ADMIN — ASPIRIN 325 MG: 325 TABLET ORAL at 10:27

## 2025-07-02 RX ADMIN — SODIUM CHLORIDE, PRESERVATIVE FREE 10 ML: 5 INJECTION INTRAVENOUS at 10:27

## 2025-07-02 NOTE — FLOWSHEET NOTE
Pt dc'd to [facility], report called in to [facility]ain[facility], no questions or concerns at this time, pt [facility] transportation[facility] noted

## 2025-07-02 NOTE — PLAN OF CARE
Speech LAnguage Pathology TREATMENT    Patient: Lorne Rader (40 y.o. male)  Date: 7/2/2025  Primary Diagnosis: Suspected cerebrovascular accident (CVA) [R09.89]  Cerebrovascular accident (CVA), unspecified mechanism (HCC) [I63.9]       Precautions: aspiration General Precautions, Fall Risk                  COMMUNICATION RECOMMENDATIONS: Encourage breath support and over-articulation     ASSESSMENT :  Patient continues w/ dysarthria; however improved to date.   Speech c/b low vocal amplitude, reduced breath support and articulatory precision.   Patient participated in conversational speech task w/ intelligibility % subjectively judged. Patient asked to repeat a word x2. Patient repeated multi-syllabic words w/ 100% intelligibility , x1 self correction and MIN A to compensatory speech strategies.   Reviewed compensatory speech strategies w/ patient and discussed application of each. Patient continues to be highly motivated and participatory in ST.     Patient reported he is tolerating regular diet and thin liquids w/ difficulty. Patient ingested thin liquids via straw w/ timely swallow and no clinical indicators of penetration and aspiration. Patient reported using \" finger sweep\" to clear right sided stasis when eating. Educated to oral motor exercises to complete: labial retraction and protrusion w/ hold, buccal puff, labial seal.     Patient will benefit from skilled intervention to address the above impairments.    GOALS:    Problem: SLP Adult - Impaired Communication  Goal: By Discharge: Demonstrates communication skills at highest level of function for planned discharge setting.  See evaluation for individualized goals.  Description: - Recall compensatory speech strategies independently   -improve vocal amplitude in sentence productions  -improve articulatory placement in sentence productions  -improve use of breath support at the sentence level   Outcome: Progressing          PLAN :  Recommendations

## 2025-07-02 NOTE — PLAN OF CARE
OCCUPATIONAL THERAPY TREATMENT  Patient: Lorne Rader (40 y.o. male)  Date: 7/2/2025  Primary Diagnosis: Suspected cerebrovascular accident (CVA) [R09.89]  Cerebrovascular accident (CVA), unspecified mechanism (HCC) [I63.9]       Precautions: General Precautions, Fall Risk                Recommendations for nursing mobility: Out of bed to chair for meals, Encourage HEP in prep for ADLs/mobility; see handout for details, Use of BSC for toileting , AD and gt belt for bed to chair , and Assist x1    In place during session: External Catheter and EKG/telemetry   Chart, occupational therapy assessment, plan of care, and goals were reviewed.  ASSESSMENT  Patient continues with skilled OT services and is progressing towards goals. Pt semi supine in bed upon GUPTA arrival, agreeable to session. Pt A&O x 4. Pt completed prom and self rom of R UE therex , see grid below for details, to maintain/ increase strength and endurance to aid in adl performance. Education provided on proper self range exercises w/ pt demonstrating good teach back. Reviewed BE FAST education.  Pt able to remember 3/6 signs. Discussed importance of learning sign and symptoms. Education provided on energy conservation, safety awareness and HEP w/ pt verbalizing good understanding. Pt left semi supine in bed with all known needs met. (See below for objective details and assist levels).     Overall pt tolerated session well today with rest breaks. Pt pleasant and appears very motivated to increase functional level to return to PLOF. Current OT recommendations for discharge High intensity and comprehensive skilled occupational therapy in a multidisciplinary setting as patient is working towards tolerating up to 3 hours of therapy/day x 5-7 days/week. Will continue to benefit from skilled OT services, and will continue to progress as tolerated.       GOALS:    Problem: Occupational Therapy - Adult  Goal: By Discharge: Performs self-care activities at  highest level of function for planned discharge setting.  See evaluation for individualized goals.  Description: FUNCTIONAL STATUS PRIOR TO ADMISSION:  Patient was ambulatory and actively engaged in the following ADL's:  Grooming, Bathing, Dressing, Toileting, IADLS, and Driving.    HOME SUPPORT: jesse    Occupational Therapy Goals:  Initiated 6/30/2025  Patient/Family stated goal: I want to better.   1.  Patient will perform self-feeding with Supervision and Additional Time using adaptive equipment as needed as he adjusts to using his non-dominant UE (LUE) within 7 day(s).  2.  Patient will perform grooming with Supervision and Additional Time using his LUE (changing hand-dominance) within 7 day(s).  3.  Patient will perform lower body dressing with Minimal Assist and Additional Time semi-supine in bed with HOB raised and rolling side-to-side to manage pants/underwear within 7 day(s).  4.  Patient will perform upper body dressing with Minimal Assist and Additional Time  utilizing one-hand technique sitting on EOB with intermittent support as needed within 7 day(s).  5.  Patient will perform all aspects of toileting with Minimal Assist and Additional Time using drop arm BSC within 7 day(s).  7/2/2025 1508 by Ann Marie Trejo, RN  Outcome: Adequate for Discharge        PLAN :  Patient continues to benefit from skilled intervention to address functional impairments. Continue treatment per established plan of care to address goals.    Recommend next OT session: UB dressing and Seated grooming    Recommendation for discharge: (in order for the patient to meet his/her long term goals): High intensity and comprehensive skilled occupational therapy in a multidisciplinary setting as patient is working towards tolerating up to 3 hours of therapy/day x 5-7 days/week    Potential barriers for safe discharge: pts current support system is unable to meet their requirements for physical assistance, pt is a high fall risk, pt is not

## 2025-07-02 NOTE — PLAN OF CARE
PHYSICAL THERAPY TREATMENT     Patient: Lorne Rader (40 y.o. male)  Date: 7/2/2025  Diagnosis: Suspected cerebrovascular accident (CVA) [R09.89]  Cerebrovascular accident (CVA), unspecified mechanism (HCC) [I63.9] Suspected cerebrovascular accident (CVA)      Precautions: General Precautions, Fall Risk                      Recommendations for nursing mobility: Out of bed to chair for meals, Encourage HEP in prep for ADLs/mobility; see handout for details, Frequent repositioning to prevent skin breakdown, Use of bed/chair alarm for safety, AD and gt belt for bed to chair , Amb to bathroom with AD and gait belt, and Assist x1    In place during session: EKG/telemetry   Chart, physical therapy assessment, plan of care and goals were reviewed.  ASSESSMENT  Patient continues with skilled PT services and is progressing towards goals. Pt supine in bed upon PT arrival, agreeable to session. (See below for objective details and assist levels).     Overall pt tolerated session fair today. Pt transferred to eob with no hands on assist using LLE to assist RLE. Pt sat eob and demo'd good sitting balance. Pt donned bilaterally shoes requiring assist to daniele R shoe completely. Pt performed seated LE therex with no c/o pain or discomfort but requiring assist to completed on RLE. Pt ambulated ~25 ft with SPC and mod A. Gait was slow and unsteady with 2 minor lob and no knee buckling noted. Pt required weight shifting to advance RLE and additional time. Pt returned supine in bed with all needs met. Pt educated on proper foot wear. Pt has crocs for shoes in the room and educated to have the action strap down around his heel when up and ambulating, especially on RLE. . Will continue to benefit from skilled PT services, and will continue to progress as tolerated. Current PT DC recommendation High intensity and comprehensive skilled physical therapy in a multidisciplinary setting as patient is working towards tolerating up

## 2025-07-02 NOTE — DISCHARGE SUMMARY
Physician Discharge Summary     Patient ID:    Lorne Rader  [id number]  40 y.o.  1985    Admit date: 6/29/2025    Discharge date : 7/2/2025      Final Diagnoses:   Suspected cerebrovascular accident (CVA) [R09.89]  Cerebrovascular accident (CVA), unspecified mechanism (HCC) [I63.9]  acute ischemic stroke  hyperlipidemia  hypertension    Reason for Hospitalization:    Lorne Rader is a 40 y.o. male with PMH of none presented with chief complaint of right sided facial droop and right-sided weakness and tingling.  Started about 2:30 AM today.  Associated with slurred speech. No vision changes.      In the ED, noted hypertensive. Lab results within normal limits. Carotid artery patent bilaterally. Otherwise CTA and CT head no acute finding.    Hospital Course:     This is a 40-year-old male who does not follow-up with any physicians, no recent medical history or does not take any medications but did report having hypertension in the past presented to the emergency department with right-sided tingling and having difficulty using his right side. Patient was found to be very hypotensive in the emergency department. CTA and CT had unremarkable. MRI brain revealed acute to subacute infarct in the left posterior basal ganglia/anterior lateral thalamus. Urology consulted, recommended continuing ASA 81, atorvastatin 80. Patient was discharged with recommendations to follow-up with local neurologist outpatient.  Patient does smoke one pack of cigarettes daily, advised patient to quit smoking.  A1c 5.4. Lipid panel with elevated cholesterol 220 and triglycerides 151, .8.    Discharge Medications:      Medication List        START taking these medications      aspirin 81 MG EC tablet  Take 1 tablet by mouth daily     atorvastatin 80 MG tablet  Commonly known as: LIPITOR  Take 1 tablet by mouth nightly               Where to Get Your Medications        These medications were sent to [facility]/pharmacy  #[id number] - [place], [place] - [address] - P [phone] - F [phone]  [address] [facility] [address]      Phone: [phone]   aspirin 81 MG EC tablet  atorvastatin 80 MG tablet           Follow up Care:    Follow-up Information       Follow up With Specialties Details Why Contact Info    Manav Mccall MD Neurology Schedule an appointment as soon as possible for a visit in 1 week(s)  [address] [place] [address]  [address]  [facility] [address] [phone]      Manav Mccall MD Neurology   [address] [facility] [address] [phone]                   Diet:  regular diet    Disposition:  Home.    Advanced Directive:   FULL    DNR      Discharge Exam:                     Vitals:    07/02/25 0704   BP:    Pulse: 79   Resp:    Temp:    SpO2:       General:  Alert, cooperative, no distress, appears stated age.   Lungs:   Clear to auscultation bilaterally.   Chest wall:  No tenderness or deformity.   Heart:  Regular rate and rhythm, S1, S2 normal, no murmur, click, rub or gallop.   Abdomen:   Soft, non-tender. Bowel sounds normal. No masses,  No organomegaly.   Extremities: Extremities normal, atraumatic, no cyanosis or edema.   Pulses: 2+ and symmetric all extremities.   Skin: Skin color, texture, turgor normal. No rashes or lesions   Neurologic: CNII-XII intact. No gross sensory or motor deficits             Significant Diagnostic Studies:   6/29/2025: BUN 12 mg/dL (Ref range: 6 - 20 mg/dL); Calcium 9.4 mg/dL (Ref range: 8.5 - 10.1 mg/dL); Chloride 112 mmol/L (H; Ref range: 97 - 108 mmol/L); CO2 26 mmol/L (Ref range: 21 - 32 mmol/L); Creatinine 1.22 mg/dL (Ref range: 0.70 - 1.30 mg/dL); Glucose 122 mg/dL (H; Ref range: 65 - 100 mg/dL); Hematocrit 48.3 % (Ref range: 36.6 - 50.3 %); Hemoglobin 17.6 g/dL (H; Ref range: 12.1 - 17.0 g/dL); Potassium 4.1 mmol/L (Ref range: 3.5 - 5.1 mmol/L); Sodium 143 mmol/L (Ref range: 136 - 145 mmol/L)  6/30/2025: BUN 11 mg/dL (Ref range: 6 - 20 mg/dL); Calcium

## 2025-07-02 NOTE — CARE COORDINATION
CM noted discharge order. Patient going to [address] [address], nurse can call report at [phone].    Transition of Care Plan:    RUR: 8%  Prior Level of Functioning: Needs Assistance  Disposition: IRF  FUNMILAYO: today  If SNF or IPR: Date FOC offered: 6/30/2025  Date FOC received: 6/30/2025  Accepting facility: [facility]  Date authorization started with reference number: n/a  Date authorization received and expires: n/a  Follow up appointments: Per MD  DME needed: n/a  Transportation at discharge: [facility]  [facility] Medicare/ letter given: n/a  Is patient a  and connected with [facility]? N/a   If yes, was  transfer form completed and [facility] notified?   Caregiver Contact: n/a  Discharge Caregiver contacted prior to discharge? N/a  Care Conference needed? N/a  Barriers to discharge: none

## 2025-07-02 NOTE — PROGRESS NOTES
Physician Progress Note      PATIENT:               JAKE THURMAN  CSN #:                  [id number]  :                       1985  ADMIT DATE:       2025 6:26 PM  DISCH DATE:  RESPONDING  PROVIDER #:        Vic tSewart MD          QUERY TEXT:    Please clarify in documentation the relationship, if any, between Ischemic   stroke and right-sided weakness. Are the conditions:    The clinical indicators include:  HTN, smoker    \"Neurologic: Right-sided facial droop noted, tongue deviated to the left.    Right upper and lower extremity 2/5 motor function decreased sensation on   right upper and lower extremity.  Upper and lower extremity intact motor and   sensory function.\" (H&P  Andrews Engle MD)  \"Stroke: The patient's symptoms and location of the stroke suggest this is   thrombotic rather than embolic.\" (Neurology CN  Varghese Farr MD)  \"Right upper extremity weakness paresthesia, Left facial droop, most likely   ischemic CVA. Neuro: Alert, awake, oriented x3, left facial droop, mild   dysarthria, right upper extremity weakness 3 out of 5, left lower extremity 5   out of 5 bilateral lower extremity 5 out of 5\" (IM PN  Vy Mccall MD)  \"right sided facial droop and right-sided weakness and tingling. MRI brain   revealed acute to subacute infarct in the left posterior basal   ganglia/anterior lateral thalamus.\" (DS  Vic Stewart MD)  Aspirin, atorvastatin, statin, neurology consulted, radiology imaging, PT OT   SLP    Thank you,  Nilsa Pleitez, [facility], S.  Options provided:  -- Related to or associated with each other  -- Unrelated to each other  -- Other - I will add my own diagnosis  -- Disagree - Not applicable / Not valid  -- Disagree - Clinically unable to determine / Unknown  -- Refer to Clinical Documentation Reviewer    PROVIDER RESPONSE TEXT:    The conditions noted are related to or associated with each other.    Query created by: Nilsa Pleitez

## 2025-07-08 ENCOUNTER — HOSPITAL ENCOUNTER (OUTPATIENT)
Facility: HOSPITAL | Age: 40
Setting detail: SPECIMEN
Discharge: HOME | End: 2025-07-11

## 2025-07-08 LAB
ANION GAP SERPL CALC-SCNC: 7 MMOL/L (ref 2–12)
BASOPHILS # BLD: 0.02 K/UL (ref 0–0.1)
BASOPHILS NFR BLD: 0.3 % (ref 0–1)
BUN SERPL-MCNC: 14 MG/DL (ref 6–20)
BUN/CREAT SERPL: 12 (ref 12–20)
CA-I BLD-MCNC: 9.8 MG/DL (ref 8.5–10.1)
CHLORIDE SERPL-SCNC: 109 MMOL/L (ref 97–108)
CO2 SERPL-SCNC: 24 MMOL/L (ref 21–32)
CREAT SERPL-MCNC: 1.13 MG/DL (ref 0.7–1.3)
DIFFERENTIAL METHOD BLD: NORMAL
EOSINOPHIL # BLD: 0.26 K/UL (ref 0–0.4)
EOSINOPHIL NFR BLD: 4.3 % (ref 0–7)
ERYTHROCYTE [DISTWIDTH] IN BLOOD BY AUTOMATED COUNT: 14.2 % (ref 11.5–14.5)
GLUCOSE SERPL-MCNC: 110 MG/DL (ref 65–100)
HCT VFR BLD AUTO: 45.9 % (ref 36.6–50.3)
HGB BLD-MCNC: 16 G/DL (ref 12.1–17)
IMM GRANULOCYTES # BLD AUTO: 0.02 K/UL (ref 0–0.04)
IMM GRANULOCYTES NFR BLD AUTO: 0.3 % (ref 0–0.5)
LYMPHOCYTES # BLD: 2.11 K/UL (ref 0.8–3.5)
LYMPHOCYTES NFR BLD: 35 % (ref 12–49)
MCH RBC QN AUTO: 29.9 PG (ref 26–34)
MCHC RBC AUTO-ENTMCNC: 34.9 G/DL (ref 30–36.5)
MCV RBC AUTO: 85.8 FL (ref 80–99)
MONOCYTES # BLD: 0.6 K/UL (ref 0–1)
MONOCYTES NFR BLD: 10 % (ref 5–13)
NEUTS SEG # BLD: 3.01 K/UL (ref 1.8–8)
NEUTS SEG NFR BLD: 50.1 % (ref 32–75)
NRBC # BLD: 0 K/UL (ref 0–0.01)
NRBC BLD-RTO: 0 PER 100 WBC
PLATELET # BLD AUTO: 172 K/UL (ref 150–400)
POTASSIUM SERPL-SCNC: 3.9 MMOL/L (ref 3.5–5.1)
RBC # BLD AUTO: 5.35 M/UL (ref 4.1–5.7)
SODIUM SERPL-SCNC: 140 MMOL/L (ref 136–145)
WBC # BLD AUTO: 6 K/UL (ref 4.1–11.1)

## 2025-07-08 PROCEDURE — 85025 COMPLETE CBC W/AUTO DIFF WBC: CPT

## 2025-07-08 PROCEDURE — 80048 BASIC METABOLIC PNL TOTAL CA: CPT

## 2025-07-12 ENCOUNTER — HOSPITAL ENCOUNTER (OUTPATIENT)
Facility: HOSPITAL | Age: 40
Setting detail: SPECIMEN
Discharge: HOME | End: 2025-07-15

## 2025-07-12 LAB
ALBUMIN SERPL-MCNC: 4.3 G/DL (ref 3.5–5)
ALBUMIN/GLOB SERPL: 1.3 (ref 1.1–2.2)
ALP SERPL-CCNC: 125 U/L (ref 45–117)
ALT SERPL-CCNC: 103 U/L (ref 12–78)
ANION GAP SERPL CALC-SCNC: 8 MMOL/L (ref 2–12)
AST SERPL W P-5'-P-CCNC: 51 U/L (ref 15–37)
BASOPHILS # BLD: 0.04 K/UL (ref 0–0.1)
BASOPHILS NFR BLD: 0.6 % (ref 0–1)
BILIRUB SERPL-MCNC: 0.5 MG/DL (ref 0.2–1)
BUN SERPL-MCNC: 19 MG/DL (ref 6–20)
BUN/CREAT SERPL: 14 (ref 12–20)
CA-I BLD-MCNC: 9.8 MG/DL (ref 8.5–10.1)
CHLORIDE SERPL-SCNC: 103 MMOL/L (ref 97–108)
CO2 SERPL-SCNC: 28 MMOL/L (ref 21–32)
CREAT SERPL-MCNC: 1.36 MG/DL (ref 0.7–1.3)
DIFFERENTIAL METHOD BLD: ABNORMAL
EOSINOPHIL # BLD: 0.26 K/UL (ref 0–0.4)
EOSINOPHIL NFR BLD: 3.7 % (ref 0–7)
ERYTHROCYTE [DISTWIDTH] IN BLOOD BY AUTOMATED COUNT: 14.1 % (ref 11.5–14.5)
GLOBULIN SER CALC-MCNC: 3.2 G/DL (ref 2–4)
GLUCOSE SERPL-MCNC: 102 MG/DL (ref 65–100)
HCT VFR BLD AUTO: 46.4 % (ref 36.6–50.3)
HGB BLD-MCNC: 15.9 G/DL (ref 12.1–17)
IMM GRANULOCYTES # BLD AUTO: 0.02 K/UL (ref 0–0.04)
IMM GRANULOCYTES NFR BLD AUTO: 0.3 % (ref 0–0.5)
LYMPHOCYTES # BLD: 2.53 K/UL (ref 0.8–3.5)
LYMPHOCYTES NFR BLD: 36.2 % (ref 12–49)
MCH RBC QN AUTO: 29.1 PG (ref 26–34)
MCHC RBC AUTO-ENTMCNC: 34.3 G/DL (ref 30–36.5)
MCV RBC AUTO: 84.8 FL (ref 80–99)
MONOCYTES # BLD: 0.63 K/UL (ref 0–1)
MONOCYTES NFR BLD: 9 % (ref 5–13)
NEUTS SEG # BLD: 3.52 K/UL (ref 1.8–8)
NEUTS SEG NFR BLD: 50.2 % (ref 32–75)
NRBC # BLD: 0 K/UL (ref 0–0.01)
NRBC BLD-RTO: 0 PER 100 WBC
PLATELET # BLD AUTO: 194 K/UL (ref 150–400)
PMV BLD AUTO: 13.3 FL (ref 8.9–12.9)
POTASSIUM SERPL-SCNC: 4.4 MMOL/L (ref 3.5–5.1)
PROT SERPL-MCNC: 7.5 G/DL (ref 6.4–8.2)
RBC # BLD AUTO: 5.47 M/UL (ref 4.1–5.7)
RBC MORPH BLD: ABNORMAL
SODIUM SERPL-SCNC: 139 MMOL/L (ref 136–145)
WBC # BLD AUTO: 7 K/UL (ref 4.1–11.1)

## 2025-07-12 PROCEDURE — 85025 COMPLETE CBC W/AUTO DIFF WBC: CPT

## 2025-07-12 PROCEDURE — 80053 COMPREHEN METABOLIC PANEL: CPT
